# Patient Record
Sex: FEMALE | URBAN - NONMETROPOLITAN AREA
[De-identification: names, ages, dates, MRNs, and addresses within clinical notes are randomized per-mention and may not be internally consistent; named-entity substitution may affect disease eponyms.]

---

## 2023-03-09 ENCOUNTER — APPOINTMENT (RX ONLY)
Dept: URBAN - NONMETROPOLITAN AREA CLINIC 13 | Facility: CLINIC | Age: 43
Setting detail: DERMATOLOGY
End: 2023-03-09

## 2023-03-09 DIAGNOSIS — Z41.9 ENCOUNTER FOR PROCEDURE FOR PURPOSES OTHER THAN REMEDYING HEALTH STATE, UNSPECIFIED: ICD-10-CM

## 2023-03-09 PROCEDURE — ? BOTOX

## 2023-03-09 NOTE — PROCEDURE: BOTOX
Show Periorbital Units: Yes
Additional Area 2 Units: 0
Glabellar Complex Units: 20
Show Right And Left Pupillary Line Units: No
Additional Area 4 Location: under lower lash lines
Inferior Lateral Orbicularis Oculi Units: 10
Expiration Date (Month Year): 10/25
Additional Area 3 Location: chin
Lot #: 
Price (Use Numbers Only, No Special Characters Or $): 301
Additional Area 2 Location: lip
Dilution (U/0.1 Cc): 1
Additional Area 6 Location: jelly rolls
Incrementing Botox Units: By 0.5 Units
Nasal Root Units: 5
Consent: Written consent obtained. Risks include but not limited to lid/brow ptosis, bruising, swelling, diplopia, temporary effect, incomplete chemical denervation.
Additional Area 1 Location: brow, R side
Detail Level: Zone
Additional Area 5 Location: [facility] [place]
Post-Care Instructions: Patient instructed to not lie down for 4 hours and limit physical activity for 24 hours. Patient instructed not to travel by airplane for 48 hours.
Forehead Units: 3
Additional Area 1 Units: 2

## 2023-05-23 ENCOUNTER — APPOINTMENT (RX ONLY)
Dept: URBAN - NONMETROPOLITAN AREA CLINIC 13 | Facility: CLINIC | Age: 43
Setting detail: DERMATOLOGY
End: 2023-05-23

## 2023-05-23 DIAGNOSIS — Z41.9 ENCOUNTER FOR PROCEDURE FOR PURPOSES OTHER THAN REMEDYING HEALTH STATE, UNSPECIFIED: ICD-10-CM

## 2023-05-23 PROCEDURE — ? ADDITIONAL NOTES

## 2023-05-23 PROCEDURE — ? BOTOX

## 2023-05-23 NOTE — PROCEDURE: ADDITIONAL NOTES
Render Risk Assessment In Note?: no
Additional Notes: Schedule for 1 cc cheek filler.
Detail Level: Simple

## 2023-05-23 NOTE — PROCEDURE: BOTOX
Left Periorbital Skin Units: 0
Post-Care Instructions: Patient instructed to not lie down for 4 hours and limit physical activity for 24 hours. Patient instructed not to travel by airplane for 48 hours.
Expiration Date (Month Year): 11/25
Glabellar Complex Units: 20
Dilution (U/0.1 Cc): 1
Show Glabellar Units: Yes
Additional Area 4 Location: under lower lash lines
Inferior Lateral Orbicularis Oculi Units: 10
Show Lcl Units: No
Additional Area 3 Location: chin
Lot #: [id number]
Price (Use Numbers Only, No Special Characters Or $): 780
Nasal Root Units: 5
Additional Area 2 Location: lip upper
Additional Area 1 Location: brow
Incrementing Botox Units: By 0.5 Units
Additional Area 6 Location: brow R side
Detail Level: Zone
Forehead Units: 3
Additional Area 6 Units: 2
Consent: Written consent obtained. Risks include but not limited to lid/brow ptosis, bruising, swelling, diplopia, temporary effect, incomplete chemical denervation.
Additional Area 5 Location: [facility]

## 2023-05-30 ENCOUNTER — APPOINTMENT (RX ONLY)
Dept: URBAN - NONMETROPOLITAN AREA CLINIC 13 | Facility: CLINIC | Age: 43
Setting detail: DERMATOLOGY
End: 2023-05-30

## 2023-05-30 DIAGNOSIS — Z41.9 ENCOUNTER FOR PROCEDURE FOR PURPOSES OTHER THAN REMEDYING HEALTH STATE, UNSPECIFIED: ICD-10-CM

## 2023-05-30 PROCEDURE — ? RESTYLANE LYFT INJECTION
